# Patient Record
Sex: FEMALE | URBAN - METROPOLITAN AREA
[De-identification: names, ages, dates, MRNs, and addresses within clinical notes are randomized per-mention and may not be internally consistent; named-entity substitution may affect disease eponyms.]

---

## 2020-07-10 ENCOUNTER — OFFICE VISIT (OUTPATIENT)
Dept: URBAN - METROPOLITAN AREA SURGERY CENTER 16 | Facility: SURGERY CENTER | Age: 75
End: 2020-07-10
Payer: MEDICARE

## 2020-07-10 DIAGNOSIS — D12.4 ADENOMATOUS POLYP OF DESCENDING COLON: ICD-10-CM

## 2020-07-10 DIAGNOSIS — D12.3 ADENOMA OF TRANSVERSE COLON: ICD-10-CM

## 2020-07-10 DIAGNOSIS — Z12.11 COLON CANCER SCREENING: ICD-10-CM

## 2020-07-10 DIAGNOSIS — D12.0 BENIGN NEOPLASM OF CECUM: ICD-10-CM

## 2020-07-10 PROCEDURE — G8907 PT DOC NO EVENTS ON DISCHARG: HCPCS | Performed by: INTERNAL MEDICINE

## 2020-07-10 PROCEDURE — 45385 COLONOSCOPY W/LESION REMOVAL: CPT | Performed by: INTERNAL MEDICINE

## 2020-07-10 PROCEDURE — G9933 CANC DETECTD DURING COL SCRN: HCPCS | Performed by: INTERNAL MEDICINE

## 2021-08-17 ENCOUNTER — OFFICE VISIT (OUTPATIENT)
Dept: URBAN - METROPOLITAN AREA CLINIC 92 | Facility: CLINIC | Age: 76
End: 2021-08-17
Payer: MEDICARE

## 2021-08-17 VITALS
SYSTOLIC BLOOD PRESSURE: 158 MMHG | WEIGHT: 198.2 LBS | BODY MASS INDEX: 33.84 KG/M2 | HEART RATE: 91 BPM | HEIGHT: 64 IN | TEMPERATURE: 98 F | DIASTOLIC BLOOD PRESSURE: 92 MMHG

## 2021-08-17 DIAGNOSIS — Z86.010 PERSONAL HISTORY OF COLON POLYPS: ICD-10-CM

## 2021-08-17 PROCEDURE — 99214 OFFICE O/P EST MOD 30 MIN: CPT | Performed by: INTERNAL MEDICINE

## 2021-08-17 RX ORDER — SODIUM, POTASSIUM,MAG SULFATES 17.5-3.13G
177 ML SOLUTION, RECONSTITUTED, ORAL ORAL
Qty: 177 MILLIMETER | Refills: 0 | OUTPATIENT
Start: 2021-08-17 | End: 2021-08-18

## 2021-08-17 NOTE — HPI-TODAY'S VISIT:
This is a 77 yo female here to schedule a colonoscopy.  A colonoscopy 2020 demonstrated 6 precancerous polyps.  The patient denies constipation diarrhea, rectal bleeding and black stools.   She is taking care of her father in [place]  who was recently diagnosed with cancer.  She states she will notbe able to schedule the procedure until 2022.

## 2023-03-02 ENCOUNTER — OFFICE VISIT (OUTPATIENT)
Dept: URBAN - METROPOLITAN AREA CLINIC 92 | Facility: CLINIC | Age: 78
End: 2023-03-02
Payer: MEDICARE

## 2023-03-02 ENCOUNTER — DASHBOARD ENCOUNTERS (OUTPATIENT)
Age: 78
End: 2023-03-02

## 2023-03-02 VITALS
HEIGHT: 64 IN | HEART RATE: 86 BPM | SYSTOLIC BLOOD PRESSURE: 135 MMHG | DIASTOLIC BLOOD PRESSURE: 87 MMHG | BODY MASS INDEX: 33.46 KG/M2 | TEMPERATURE: 97.1 F | WEIGHT: 196 LBS

## 2023-03-02 DIAGNOSIS — Z86.010 PERSONAL HISTORY OF COLON POLYPS: ICD-10-CM

## 2023-03-02 PROBLEM — 428283002 HISTORY OF POLYP OF COLON: Status: ACTIVE | Noted: 2021-08-17

## 2023-03-02 PROCEDURE — 99214 OFFICE O/P EST MOD 30 MIN: CPT | Performed by: INTERNAL MEDICINE

## 2023-03-02 RX ORDER — POLYETHYLENE GLYCOL 3350, SODIUM CHLORIDE, SODIUM BICARBONATE, POTASSIUM CHLORIDE 420; 11.2; 5.72; 1.48 G/4L; G/4L; G/4L; G/4L
AS DIRECTED POWDER, FOR SOLUTION ORAL ONCE
Qty: 4000 | Refills: 0 | OUTPATIENT
Start: 2023-03-02 | End: 2023-03-03

## 2023-03-02 NOTE — PHYSICAL EXAM RECTAL:
normal tone, no external hemorrhoids, no masses palpable, no red blood, Tenderness on URBANO, Internal hemorrhoids present

## 2023-03-02 NOTE — HPI-OTHER HISTORIES
(August 2021) This is a 75 yo female here to schedule a colonoscopy.  A colonoscopy 2020 demonstrated 6 precancerous polyps.  The patient denies constipation diarrhea, rectal bleeding and black stools.   She is taking care of her father in [place]  who was recently diagnosed with cancer.  She states she will notbe able to schedule the procedure until 2022.

## 2023-03-09 ENCOUNTER — LAB OUTSIDE AN ENCOUNTER (OUTPATIENT)
Dept: URBAN - METROPOLITAN AREA CLINIC 92 | Facility: CLINIC | Age: 78
End: 2023-03-09

## 2023-04-28 ENCOUNTER — OFFICE VISIT (OUTPATIENT)
Dept: URBAN - METROPOLITAN AREA SURGERY CENTER 16 | Facility: SURGERY CENTER | Age: 78
End: 2023-04-28